# Patient Record
Sex: MALE | Race: WHITE
[De-identification: names, ages, dates, MRNs, and addresses within clinical notes are randomized per-mention and may not be internally consistent; named-entity substitution may affect disease eponyms.]

---

## 2021-06-17 ENCOUNTER — HOSPITAL ENCOUNTER (INPATIENT)
Dept: HOSPITAL 54 - ER | Age: 76
LOS: 1 days | Discharge: LEFT BEFORE DISCHARGE | DRG: 640 | End: 2021-06-18
Attending: FAMILY MEDICINE | Admitting: FAMILY MEDICINE
Payer: MEDICARE

## 2021-06-17 VITALS — DIASTOLIC BLOOD PRESSURE: 66 MMHG | SYSTOLIC BLOOD PRESSURE: 120 MMHG

## 2021-06-17 VITALS — SYSTOLIC BLOOD PRESSURE: 120 MMHG | DIASTOLIC BLOOD PRESSURE: 66 MMHG

## 2021-06-17 VITALS — HEIGHT: 63 IN | WEIGHT: 152.31 LBS | BODY MASS INDEX: 26.99 KG/M2

## 2021-06-17 DIAGNOSIS — E66.9: ICD-10-CM

## 2021-06-17 DIAGNOSIS — E87.2: ICD-10-CM

## 2021-06-17 DIAGNOSIS — E87.6: ICD-10-CM

## 2021-06-17 DIAGNOSIS — D72.829: ICD-10-CM

## 2021-06-17 DIAGNOSIS — R00.1: ICD-10-CM

## 2021-06-17 DIAGNOSIS — I10: ICD-10-CM

## 2021-06-17 DIAGNOSIS — N17.0: ICD-10-CM

## 2021-06-17 DIAGNOSIS — E78.5: ICD-10-CM

## 2021-06-17 DIAGNOSIS — I95.9: ICD-10-CM

## 2021-06-17 DIAGNOSIS — Z20.822: ICD-10-CM

## 2021-06-17 DIAGNOSIS — R07.9: ICD-10-CM

## 2021-06-17 DIAGNOSIS — Z71.3: ICD-10-CM

## 2021-06-17 DIAGNOSIS — E11.9: ICD-10-CM

## 2021-06-17 DIAGNOSIS — E83.42: Primary | ICD-10-CM

## 2021-06-17 LAB
BASOPHILS # BLD AUTO: 0.1 /CMM (ref 0–0.2)
BASOPHILS NFR BLD AUTO: 1 % (ref 0–2)
BUN SERPL-MCNC: 29 MG/DL (ref 7–18)
CALCIUM SERPL-MCNC: 8.5 MG/DL (ref 8.5–10.1)
CHLORIDE SERPL-SCNC: 104 MMOL/L (ref 98–107)
CO2 SERPL-SCNC: 20 MMOL/L (ref 21–32)
CREAT SERPL-MCNC: 1.3 MG/DL (ref 0.6–1.3)
EOSINOPHIL NFR BLD AUTO: 1.6 % (ref 0–6)
GLUCOSE SERPL-MCNC: 158 MG/DL (ref 74–106)
HCT VFR BLD AUTO: 45 % (ref 39–51)
HGB BLD-MCNC: 14 G/DL (ref 13.5–17.5)
LYMPHOCYTES NFR BLD AUTO: 29.2 % (ref 20–44)
LYMPHOCYTES NFR BLD AUTO: 3.4 /CMM (ref 0.8–4.8)
MAGNESIUM SERPL-MCNC: 1.5 MG/DL (ref 1.8–2.4)
MCHC RBC AUTO-ENTMCNC: 31 G/DL (ref 31–36)
MCV RBC AUTO: 79 FL (ref 80–96)
MONOCYTES NFR BLD AUTO: 1.4 /CMM (ref 0.1–1.3)
MONOCYTES NFR BLD AUTO: 12.4 % (ref 2–12)
NEUTROPHILS # BLD AUTO: 6.5 /CMM (ref 1.8–8.9)
NEUTROPHILS NFR BLD AUTO: 55.8 % (ref 43–81)
NT-PROBNP SERPL-MCNC: 32 PG/ML (ref 0–125)
PHOSPHATE SERPL-MCNC: 3.6 MG/DL (ref 2.5–4.9)
PLATELET # BLD AUTO: 210 /CMM (ref 150–450)
POTASSIUM SERPL-SCNC: 3.5 MMOL/L (ref 3.5–5.1)
RBC # BLD AUTO: 5.66 MIL/UL (ref 4.5–6)
SODIUM SERPL-SCNC: 138 MMOL/L (ref 136–145)
TSH SERPL DL<=0.005 MIU/L-ACNC: 4.94 UIU/ML (ref 0.36–3.74)
WBC NRBC COR # BLD AUTO: 11.7 K/UL (ref 4.3–11)

## 2021-06-17 PROCEDURE — C9803 HOPD COVID-19 SPEC COLLECT: HCPCS

## 2021-06-17 PROCEDURE — G0378 HOSPITAL OBSERVATION PER HR: HCPCS

## 2021-06-17 NOTE — NUR
-------------------------------------------------------------------------------

          *** Note yaakov in EDM - 06/17/21 at 1942 by ANDERS ***           

-------------------------------------------------------------------------------

MARILEE 89 FROM HOME C/O WEAKENESS AND HYPOTENSION, PT STATES HAVING DIARRHEA X1 
DAYS AND NOT DRINKING MUCH WATER, BP WNL, HR IN THE 30-40S MD AT BEDSIDE FOR 
EVAL

## 2021-06-17 NOTE — NUR
MARILEE 89 FROM HOME C/O WEAKENESS AND HYPOTENSION, PT STATES HAVING DIARRHEA X1 
DAYS AND NOT DRINKING MUCH WATER, BP WNL, HR IN THE 30-40S MD AT BEDSIDE FOR 
EVAL

## 2021-06-17 NOTE — NUR
TELE RN: ADMISSION

77 y/o male A/O x4. Skin checked done, skin intact, no pressure injury. No c/o pain. 
Oriented to room, unit, staff. Fall precaution maintained.

## 2021-06-18 VITALS — SYSTOLIC BLOOD PRESSURE: 105 MMHG | DIASTOLIC BLOOD PRESSURE: 63 MMHG

## 2021-06-18 VITALS — DIASTOLIC BLOOD PRESSURE: 62 MMHG | SYSTOLIC BLOOD PRESSURE: 109 MMHG

## 2021-06-18 VITALS — SYSTOLIC BLOOD PRESSURE: 126 MMHG | DIASTOLIC BLOOD PRESSURE: 69 MMHG

## 2021-06-18 LAB
BASOPHILS # BLD AUTO: 0 /CMM (ref 0–0.2)
BASOPHILS NFR BLD AUTO: 0.4 % (ref 0–2)
BUN SERPL-MCNC: 26 MG/DL (ref 7–18)
CALCIUM SERPL-MCNC: 8.5 MG/DL (ref 8.5–10.1)
CHLORIDE SERPL-SCNC: 107 MMOL/L (ref 98–107)
CHOLEST SERPL-MCNC: 105 MG/DL (ref ?–200)
CO2 SERPL-SCNC: 24 MMOL/L (ref 21–32)
CREAT SERPL-MCNC: 1.3 MG/DL (ref 0.6–1.3)
EOSINOPHIL NFR BLD AUTO: 1.1 % (ref 0–6)
GLUCOSE SERPL-MCNC: 88 MG/DL (ref 74–106)
HCT VFR BLD AUTO: 43 % (ref 39–51)
HDLC SERPL-MCNC: 37 MG/DL (ref 40–60)
HGB BLD-MCNC: 13.7 G/DL (ref 13.5–17.5)
LDLC SERPL DIRECT ASSAY-MCNC: 56 MG/DL (ref 0–99)
LYMPHOCYTES NFR BLD AUTO: 2.7 /CMM (ref 0.8–4.8)
LYMPHOCYTES NFR BLD AUTO: 24.1 % (ref 20–44)
MAGNESIUM SERPL-MCNC: 2 MG/DL (ref 1.8–2.4)
MCHC RBC AUTO-ENTMCNC: 32 G/DL (ref 31–36)
MCV RBC AUTO: 80 FL (ref 80–96)
MONOCYTES NFR BLD AUTO: 1.2 /CMM (ref 0.1–1.3)
MONOCYTES NFR BLD AUTO: 10.9 % (ref 2–12)
NEUTROPHILS # BLD AUTO: 7.1 /CMM (ref 1.8–8.9)
NEUTROPHILS NFR BLD AUTO: 63.5 % (ref 43–81)
PHOSPHATE SERPL-MCNC: 3.5 MG/DL (ref 2.5–4.9)
PLATELET # BLD AUTO: 194 /CMM (ref 150–450)
POTASSIUM SERPL-SCNC: 3.9 MMOL/L (ref 3.5–5.1)
RBC # BLD AUTO: 5.42 MIL/UL (ref 4.5–6)
SODIUM SERPL-SCNC: 143 MMOL/L (ref 136–145)
TRIGL SERPL-MCNC: 103 MG/DL (ref 30–150)
WBC NRBC COR # BLD AUTO: 11.2 K/UL (ref 4.3–11)

## 2021-06-18 NOTE — NUR
RN NOTE



PATIENT WENT HOME AGAINST MEDICAL ADVISE. ACCOMPANIED BY NURSE ON A WHEELCHAIR WITH WIFE 
DRIVING THE PATIENT HOME. IN STABLE CONDITION.

## 2021-06-18 NOTE — NUR
TELE RN: END OF SHIFT REPORT

Sinus Clif in the Tele monitor HR 49. Patient no distress, no c/o chest pain, tolerating 
room air. Patient independent with mobility, no episode of fainting, no dizziness during 
ambulation. Ongoing IVF. Fall precaution maintained. Will endorse to oncoming RN.

## 2021-06-18 NOTE — NUR
TELE RN OPENING NOTE



PATIENT RECEIVED ALERT AND ORIENTED X 4 ON BED. PATIENT ON ROOM AIR WITH NON LABORED AND 
EVEN/ REGULAR BREATHING, WITH NO SIGNS OF RESPIRATORY DISTRESS. PATIENT WITH IV ACCESS ON 
RIGHT FA G#22 AND LEFT AC G#20 WITH NORMAL SALINE RUNNING AT 75ML/HR, PATENT AND INTACT. 
PATIENT AMBULATORY WITH NO ASSISTIVE DEVICES. SAFETY MEASURES ENSURED WITH SIDERAILS UP, 
BEDS LOCKED AND AT LOWEST POSITION. CALL LIGHT AND TABLE WITHIN REACH AT ALL TIMES. WILL 
CONTINUE TO MONITOR PATIENT.

## 2021-06-18 NOTE — NUR
TELE RN NOTE



PATIENT SEEN BY DR. HINTON. PATIENT REFUSED PACEMAKER PLACEMENT AND SIGNED AMA FORM, 
VERBALIZED UNDERSTANDING AND REPERCUSSIONS OF ACTION. FORM ATTACHED TO CHART. PER PATIENT, 
FAMILY WILL PICK HIM UP IN A WHILE.

## 2024-04-24 ENCOUNTER — OFFICE (OUTPATIENT)
Dept: URBAN - METROPOLITAN AREA CLINIC 45 | Facility: CLINIC | Age: 79
End: 2024-04-24

## 2024-04-24 VITALS
HEART RATE: 85 BPM | HEIGHT: 64 IN | WEIGHT: 153 LBS | SYSTOLIC BLOOD PRESSURE: 126 MMHG | DIASTOLIC BLOOD PRESSURE: 58 MMHG

## 2024-04-24 DIAGNOSIS — Z80.0 FAMILY HISTORY OF COLON CANCER, FATHER: ICD-10-CM

## 2024-04-24 DIAGNOSIS — D50.9: ICD-10-CM

## 2024-04-24 DIAGNOSIS — K21.9: ICD-10-CM

## 2024-04-24 DIAGNOSIS — Z86.010 PERSONAL HISTORY OF COLONIC POLYPS: ICD-10-CM

## 2024-04-24 PROCEDURE — 99204 OFFICE O/P NEW MOD 45 MIN: CPT | Performed by: INTERNAL MEDICINE

## 2024-04-24 NOTE — SERVICEHPINOTES
Patient presents for evaluation of iron deficiency anemia. Recent lab work showed hemoglobin of 12.1 and low iron and ferritin levels. Patient recently had glaucoma surgery, but denies significant blood loss. He was started on iron supplementation.There has been a previous EGD and colon screening an almost 15 years ago. The patient has no family history of colon cancer or other significant GI diseases. Patient denies fever, nausea, vomiting, dysphagia, reflux, abdominal pain, change in bowel habits, constipation, diarrhea, rectal bleeding, melena, and significant change in weight. Denies shortness of breath and chest pain.

## 2024-06-03 ENCOUNTER — AMBULATORY SURGICAL CENTER (OUTPATIENT)
Dept: URBAN - METROPOLITAN AREA SURGERY 28 | Facility: SURGERY | Age: 79
End: 2024-06-03

## 2024-06-03 VITALS
RESPIRATION RATE: 17 BRPM | HEART RATE: 71 BPM | TEMPERATURE: 97.3 F | SYSTOLIC BLOOD PRESSURE: 156 MMHG | DIASTOLIC BLOOD PRESSURE: 94 MMHG | WEIGHT: 150 LBS | HEIGHT: 64 IN | OXYGEN SATURATION: 99 %

## 2024-06-03 DIAGNOSIS — D12.5 BENIGN NEOPLASM OF SIGMOID COLON: ICD-10-CM

## 2024-06-03 DIAGNOSIS — D50.9 IRON DEFICIENCY ANEMIA, UNSPECIFIED: ICD-10-CM

## 2024-06-03 DIAGNOSIS — Z86.010 PERSONAL HISTORY OF COLONIC POLYPS: ICD-10-CM

## 2024-06-03 DIAGNOSIS — K62.1 RECTAL POLYP: ICD-10-CM

## 2024-06-03 PROBLEM — K31.89 OTHER DISEASES OF STOMACH AND DUODENUM: Status: ACTIVE | Noted: 2024-06-03

## 2024-06-03 PROBLEM — K63.5 POLYP OF COLON: Status: ACTIVE | Noted: 2024-06-03

## 2024-06-03 PROCEDURE — 43239 EGD BIOPSY SINGLE/MULTIPLE: CPT | Performed by: INTERNAL MEDICINE

## 2024-06-03 PROCEDURE — 45380 COLONOSCOPY AND BIOPSY: CPT | Mod: 59 | Performed by: INTERNAL MEDICINE

## 2024-06-03 PROCEDURE — 45385 COLONOSCOPY W/LESION REMOVAL: CPT | Performed by: INTERNAL MEDICINE

## 2024-06-03 NOTE — SERVICEHPINOTES
Patient presents for endoscopic evaluation of iron deficiency anemia. Recent lab work showed hemoglobin of 12.1 and low iron and ferritin levels. Patient recently had glaucoma surgery, but denies significant blood loss. He was started on iron supplementation. There has been a previous EGD and colon screening an almost 15 years ago. The patient has no family history of colon cancer or other significant GI diseases. Patient denies fever, nausea, vomiting, dysphagia, reflux, abdominal pain, change in bowel habits, constipation, diarrhea, rectal bleeding, melena, and significant change in weight. Denies shortness of breath and chest pain.

## 2024-06-12 LAB
GI HISTOLOGY: A: (no result)
GI HISTOLOGY: B: (no result)
GI HISTOLOGY: C: (no result)
GI HISTOLOGY: D: (no result)

## 2024-06-14 ENCOUNTER — OFFICE (OUTPATIENT)
Dept: URBAN - METROPOLITAN AREA CLINIC 45 | Facility: CLINIC | Age: 79
End: 2024-06-14

## 2024-06-14 VITALS — HEIGHT: 64 IN | WEIGHT: 150 LBS

## 2024-06-14 DIAGNOSIS — D50.9: ICD-10-CM

## 2024-06-14 DIAGNOSIS — K21.9: ICD-10-CM

## 2024-06-14 DIAGNOSIS — D12.6 ADENOMATOUS COLON POLYP: ICD-10-CM

## 2024-06-14 DIAGNOSIS — K57.30 DIVERTICULOSIS OF LARGE INTESTINE WITHOUT PERFORATION OR ABS: ICD-10-CM

## 2024-06-14 DIAGNOSIS — Z86.010 PERSONAL HISTORY OF COLONIC POLYPS: ICD-10-CM

## 2024-06-14 PROCEDURE — 99214 OFFICE O/P EST MOD 30 MIN: CPT | Mod: 95 | Performed by: INTERNAL MEDICINE

## 2024-06-14 RX ORDER — CLARITHROMYCIN 500 MG/1
1000 TABLET ORAL
Qty: 28 | Status: ACTIVE
Start: 2024-06-14

## 2024-06-14 RX ORDER — OMEPRAZOLE 40 MG/1
CAPSULE, DELAYED RELEASE ORAL
Qty: 90 | Status: ACTIVE
Start: 2024-06-14

## 2024-06-14 RX ORDER — AMOXICILLIN 500 MG/1
2000 TABLET, FILM COATED ORAL
Qty: 56 | Status: ACTIVE
Start: 2024-06-14

## 2024-06-14 NOTE — SERVICENOTES
30 minutes were spent in dedicated E/M time during the date of service, including preparation of the medical chart, review of previous information, data analysis/discussion, and/or discussion with the patient
[place]

## 2024-06-14 NOTE — SERVICEHPINOTES
The patient is scheduled today for  telehealth visit.The clinician is connected to a secure network. The patient consents to this teleconference being a billable service. This visit used [employer] for a live, interactive audio and video telehealth visit.   Patient presents for endoscopic evaluation of iron deficiency anemia. 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 Colonoscopy showed diverticulosis, several small adenomatous polyps were removed, no stigmata of bleeding was seen. Patient has been taking iron pills for several weeks due to mild iron deficiency anemia with hemoglobin of 12.1. He does not recall prior history of H. pylori infection.

## 2024-08-09 LAB — HELICOBACTER PYLORI, UREA BREATH TEST: NOT DETECTED
